# Patient Record
(demographics unavailable — no encounter records)

---

## 2018-05-16 NOTE — RAD
RIGHT FOREARM:

 

HISTORY:

Injury to right forearm with pain.

 

FINDINGS:

Two views obtained.

 

There is a slightly oblique fracture involving the mid shaft of the ulna with slight displacement.

 

IMPRESSION:

Fracture, mid to distal shaft, ulna.

 

POS: [facility]

## 2018-05-24 NOTE — RAD
TWO INTRAOPERATIVE FLUOROSCOPIC IMAGES RIGHT FOREARM:

 

Date: 5-24-18 

 

History: ORIF right forearm

 

Comparison: 5-16-18

 

FINDINGS: 

There is a plate and screws now transfixing the previously seen fracture involving the middle one-thi
rd diaphysis of the right ulnar. There is improvement in alignment of the fracture fragments. Most pr
oximal distal portions of the forearm are not imaged on fluoroscopic images. 

 

Fluoroscopy: 3 seconds with total dose of 0.12 mGy*cm^2. 

 

IMPRESSION: 

Internal fixation of fracture right ulna. 

 

POS: [facility]

## 2018-05-25 NOTE — OP
DATE OF SURGERY:  05/24/2018

 

PREOPERATIVE DIAGNOSIS:  Closed right ulnar shaft fracture.

 

POSTOPERATIVE DIAGNOSIS:  Closed right ulnar shaft fracture.

 

SURGICAL PROCEDURE:  Open reduction internal fixation right ulna.

 

ANESTHESIA:  General.

 

SURGEON:  Dr. Rick Mejía 

 

ASSISTANT:  Maco Raymond PA-C.

 

TOURNIQUET TIME:  41 minutes at 250 mmHg.

 

BLOOD LOSS:  5 mL.

 

IMPLANTS:  Synthes 7-hole 3.5 mm LCDCP.

 

COMPLICATIONS:  None.

 

DRAINS:  None.

 

SPECIMEN:  None.

 

OUTCOME:  Satisfactory.

 

INDICATIONS:  The patient is a 59-year-old gentleman status post ulnar shaft fracture with displaceme
nt.  After discussion with patient including the risks and benefits, we decided to proceed with open 
reduction internal fixation of this fracture.  Informed consent has been obtained.  I believe all que
stions answered.

 

PROCEDURE:  The patient was brought to the operating room and timeout performed followed by induction
 of general anesthesia.  Next, the patient was positioned on the OR table with the right arm on a lassiter
d board.  Next, the limb was exsanguinated with Esmarch bandage, tourniquet inflated to 250 mmHg.  A 
vertical incision was made overlying the fracture along the subcutaneous border of the ulna.  After s
kin was sharply incised, dissection was carried down between the extensor carpi ulnaris and extensor 
and flexor carpi ulnaris.  Next subperiosteal dissection was made dorsally and then the fracture redu
angelique and held in place with bone tenaculum.  Next, a 7-hole 3.5 mm LCDCP was applied to this cortex an
d then held in place with cortical screws proximally and distally with standard compression technique
 utilized.  This resulted in near anatomic alignment of the fracture.  It was checked both visually a
s well as on AP and lateral C-arm images.  The wound was then irrigated with normal saline and closed
 in layers with 0 Vicryl for the fascia, 2-0 Vicryl subcutaneously, and then staples for the skin.  A
 Xeroform gauze, Webril, and sugar tong splint was applied to the arm.  Tourniquet was let down at co
mpletion of dressing, and then patient was transferred to recovery room in stable condition.  There w
ere no complications.  The patient tolerated the procedure well.